# Patient Record
Sex: FEMALE | Race: WHITE | NOT HISPANIC OR LATINO | Employment: UNEMPLOYED | URBAN - METROPOLITAN AREA
[De-identification: names, ages, dates, MRNs, and addresses within clinical notes are randomized per-mention and may not be internally consistent; named-entity substitution may affect disease eponyms.]

---

## 2024-05-06 DIAGNOSIS — N60.02 BREAST CYST, LEFT: Primary | ICD-10-CM

## 2024-06-13 ENCOUNTER — APPOINTMENT (OUTPATIENT)
Dept: MAMMOGRAPHY | Age: 44
End: 2024-06-13
Attending: OBSTETRICS & GYNECOLOGY

## 2024-06-28 ENCOUNTER — HOSPITAL ENCOUNTER (OUTPATIENT)
Dept: ULTRASOUND IMAGING | Age: 44
End: 2024-06-28
Attending: OBSTETRICS & GYNECOLOGY

## 2024-06-28 ENCOUNTER — HOSPITAL ENCOUNTER (OUTPATIENT)
Dept: MAMMOGRAPHY | Age: 44
End: 2024-06-28
Attending: OBSTETRICS & GYNECOLOGY

## 2024-06-28 DIAGNOSIS — N60.02 BREAST CYST, LEFT: ICD-10-CM

## 2024-06-28 DIAGNOSIS — R92.8 ABNORMAL FINDINGS ON DIAGNOSTIC IMAGING OF BREAST: ICD-10-CM

## 2024-06-28 PROCEDURE — G0279 TOMOSYNTHESIS, MAMMO: HCPCS

## 2024-06-28 PROCEDURE — 76642 ULTRASOUND BREAST LIMITED: CPT

## 2024-10-14 ENCOUNTER — HOSPITAL ENCOUNTER (OUTPATIENT)
Age: 44
Discharge: HOME | End: 2024-10-14
Payer: COMMERCIAL

## 2024-10-14 ENCOUNTER — APPOINTMENT (OUTPATIENT)
Dept: GENERAL RADIOLOGY | Age: 44
End: 2024-10-14
Attending: NURSE PRACTITIONER
Payer: COMMERCIAL

## 2024-10-14 VITALS
SYSTOLIC BLOOD PRESSURE: 107 MMHG | DIASTOLIC BLOOD PRESSURE: 73 MMHG | HEART RATE: 87 BPM | TEMPERATURE: 98 F | OXYGEN SATURATION: 99 % | RESPIRATION RATE: 18 BRPM

## 2024-10-14 DIAGNOSIS — R05.1 ACUTE COUGH: Primary | ICD-10-CM

## 2024-10-14 PROCEDURE — 71046 X-RAY EXAM CHEST 2 VIEWS: CPT | Performed by: NURSE PRACTITIONER

## 2024-10-14 PROCEDURE — 99203 OFFICE O/P NEW LOW 30 MIN: CPT | Performed by: NURSE PRACTITIONER

## 2024-10-14 RX ORDER — BENZONATATE 100 MG/1
100 CAPSULE ORAL 3 TIMES DAILY PRN
Qty: 30 CAPSULE | Refills: 0 | Status: SHIPPED | OUTPATIENT
Start: 2024-10-14

## 2024-10-14 NOTE — ED PROVIDER NOTES
Patient Seen in: [facility]      History     Chief Complaint   Patient presents with    Cough    Fever     Stated Complaint: cough, chest pain    Subjective:   45 y/o female with unremarkable medical history presents with c/o cough x 3 weeks. Low grade fevers, mild sob, some cp when coughing only. No chills, abd pain, n/v/d              Objective:     History reviewed. No pertinent past medical history.           History reviewed. No pertinent surgical history.             Social History     Socioeconomic History    Marital status: Single   Tobacco Use    Smoking status: Some Days     Types: Cigarettes     Passive exposure: Never    Smokeless tobacco: Never   Vaping Use    Vaping status: Some Days              Review of Systems   Constitutional:  Positive for fever. Negative for chills.   HENT:  Negative for congestion, rhinorrhea and sore throat.    Respiratory:  Positive for cough and shortness of breath.    Cardiovascular:  Positive for chest pain (when coughing only).   Gastrointestinal:  Negative for abdominal pain, diarrhea, nausea and vomiting.   Neurological:  Negative for headaches.   All other systems reviewed and are negative.      Positive for stated complaint: cough, chest pain  Other systems are as noted in HPI.  Constitutional and vital signs reviewed.      All other systems reviewed and negative except as noted above.    Physical Exam     ED Triage Vitals [10/14/24 1007]   /73   Pulse 87   Resp 18   Temp 98 °F (36.7 °C)   Temp src Temporal   SpO2 99 %   O2 Device        Current Vitals:   Vital Signs  BP: 107/73  Pulse: 87  Resp: 18  Temp: 98 °F (36.7 °C)  Temp src: Temporal    Oxygen Therapy  SpO2: 99 %        Physical Exam  Vitals and nursing note reviewed.   Constitutional:       General: She is not in acute distress.     Appearance: Normal appearance. She is not ill-appearing.   HENT:      Head: Normocephalic.      Right Ear: Tympanic membrane and external ear normal.       Left Ear: Tympanic membrane and external ear normal.      Nose: Nose normal.      Mouth/Throat:      Mouth: Mucous membranes are moist.      Pharynx: Oropharynx is clear. Uvula midline.      Tonsils: No tonsillar exudate.   Cardiovascular:      Rate and Rhythm: Normal rate and regular rhythm.   Pulmonary:      Effort: Pulmonary effort is normal.      Breath sounds: Normal breath sounds.   Musculoskeletal:         General: Normal range of motion.      Cervical back: Normal range of motion and neck supple.   Skin:     General: Skin is warm.      Capillary Refill: Capillary refill takes less than 2 seconds.   Neurological:      Mental Status: She is alert and oriented to person, place, and time.      GCS: GCS eye subscore is 4. GCS verbal subscore is 5. GCS motor subscore is 6.   Psychiatric:         Behavior: Behavior is cooperative.             ED Course   Labs Reviewed - No data to display  XR CHEST PA + LAT CHEST (CPT=71046)          PROCEDURE: XR CHEST PA + LAT CHEST (CPT=71046)     COMPARISON: None.     INDICATIONS: Acute chough, shortness of breath, and chest pain     TECHNIQUE:   Two views.       FINDINGS:   CARDIAC/VASC: No cardiac silhouette abnormality or cardiomegaly.  Unremarkable pulmonary vasculature.    MEDIAST/JIM: No visible mass or adenopathy.   LUNGS/PLEURA: Normal.  No significant pulmonary parenchymal abnormalities.  No effusion or pleural thickening.    BONES: Mild S-shaped curvature dorsal spine.  Mild pectus excavatum inferior margin anterior chest  OTHER: Small sclerotic bony lesion right humeral head probably an incidental bone island              =====  CONCLUSION:   1. No acute cardiopulmonary disease           Dictated by (CST): Pete Mayers MD on 10/14/2024 at 10:25 AM       Finalized by (CST): Pete Mayers MD on 10/14/2024 at 10:28 AM                               [facility]              Medical Decision Making  Patient is well-appearing. Resp easy non labored  I discussed  differentials with patient including but not limited to viral uri vs pneumonia.  Xray independently reviewed and discussed with patient. Negative for pneumonia  Push fluids, cool mist humidifier, good hand washing  RX benzonatate  otc meds prn  Fu with PCP. Return/ ED precautions discussed      Problems Addressed:  Acute cough: acute illness or injury    Amount and/or Complexity of Data Reviewed  Radiology: ordered. Decision-making details documented in ED Course.    Risk  OTC drugs.  Prescription drug management.        Disposition and Plan     Clinical Impression:  1. Acute cough         Disposition:  Discharge  10/14/2024 10:37 am    Follow-up:  Basilio Pratt MD  [address] [place] [address]  [phone]      or follow up with your Primary Doctor          Medications Prescribed:  Discharge Medication List as of 10/14/2024 11:01 AM        START taking these medications    Details   benzonatate 100 MG Oral Cap Take 1 capsule (100 mg total) by mouth 3 (three) times daily as needed for cough., Normal, Disp-30 capsule, R-0                 Supplementary Documentation:

## 2024-10-14 NOTE — ED INITIAL ASSESSMENT (HPI)
Pt presents with cough x 3 weeks. Pt reports low grade fever and mild SOB. Pt non-productive cough, was productive early in illness. CP with deep cough. Pt reports hx of Bronchitis.

## 2025-02-18 ENCOUNTER — OFFICE VISIT (OUTPATIENT)
Facility: CLINIC | Age: 45
End: 2025-02-18
Payer: COMMERCIAL

## 2025-02-18 ENCOUNTER — LAB ENCOUNTER (OUTPATIENT)
Dept: LAB | Facility: REFERENCE LAB | Age: 45
End: 2025-02-18
Attending: FAMILY MEDICINE
Payer: COMMERCIAL

## 2025-02-18 VITALS
OXYGEN SATURATION: 98 % | BODY MASS INDEX: 21.34 KG/M2 | HEART RATE: 78 BPM | DIASTOLIC BLOOD PRESSURE: 72 MMHG | WEIGHT: 125 LBS | HEIGHT: 64 IN | SYSTOLIC BLOOD PRESSURE: 120 MMHG

## 2025-02-18 DIAGNOSIS — Z97.5 IUD (INTRAUTERINE DEVICE) IN PLACE: ICD-10-CM

## 2025-02-18 DIAGNOSIS — N93.0 BLEEDING AFTER INTERCOURSE: ICD-10-CM

## 2025-02-18 DIAGNOSIS — Z00.01 ENCOUNTER FOR GENERAL ADULT MEDICAL EXAMINATION WITH ABNORMAL FINDINGS: ICD-10-CM

## 2025-02-18 DIAGNOSIS — Z11.3 SCREENING FOR STD (SEXUALLY TRANSMITTED DISEASE): ICD-10-CM

## 2025-02-18 DIAGNOSIS — N76.0 ACUTE VAGINITIS: ICD-10-CM

## 2025-02-18 DIAGNOSIS — Z00.01 ENCOUNTER FOR GENERAL ADULT MEDICAL EXAMINATION WITH ABNORMAL FINDINGS: Primary | ICD-10-CM

## 2025-02-18 DIAGNOSIS — Z12.31 VISIT FOR SCREENING MAMMOGRAM: ICD-10-CM

## 2025-02-18 DIAGNOSIS — Z12.4 SCREENING FOR CERVICAL CANCER: ICD-10-CM

## 2025-02-18 LAB
ANION GAP SERPL CALC-SCNC: 9 MMOL/L (ref 0–18)
BASOPHILS # BLD AUTO: 0.06 X10(3) UL (ref 0–0.2)
BASOPHILS NFR BLD AUTO: 0.8 %
BUN BLD-MCNC: 8 MG/DL (ref 9–23)
BUN/CREAT SERPL: 12.7 (ref 10–20)
CALCIUM BLD-MCNC: 9.5 MG/DL (ref 8.7–10.4)
CHLORIDE SERPL-SCNC: 103 MMOL/L (ref 98–112)
CHOLEST SERPL-MCNC: 162 MG/DL (ref ?–200)
CO2 SERPL-SCNC: 27 MMOL/L (ref 21–32)
CREAT BLD-MCNC: 0.63 MG/DL
DEPRECATED RDW RBC AUTO: 43.4 FL (ref 35.1–46.3)
EGFRCR SERPLBLD CKD-EPI 2021: 113 ML/MIN/1.73M2 (ref 60–?)
EOSINOPHIL # BLD AUTO: 0.27 X10(3) UL (ref 0–0.7)
EOSINOPHIL NFR BLD AUTO: 3.7 %
ERYTHROCYTE [DISTWIDTH] IN BLOOD BY AUTOMATED COUNT: 11.9 % (ref 11–15)
EST. AVERAGE GLUCOSE BLD GHB EST-MCNC: 105 MG/DL (ref 68–126)
FASTING PATIENT LIPID ANSWER: YES
FASTING STATUS PATIENT QL REPORTED: YES
GLUCOSE BLD-MCNC: 104 MG/DL (ref 70–99)
HBA1C MFR BLD: 5.3 % (ref ?–5.7)
HBV SURFACE AB SER QL: NONREACTIVE
HBV SURFACE AB SERPL IA-ACNC: <3.1 MIU/ML
HBV SURFACE AG SER-ACNC: <0.1 [IU]/L
HBV SURFACE AG SERPL QL IA: NONREACTIVE
HCT VFR BLD AUTO: 42.7 %
HCV AB SERPL QL IA: NONREACTIVE
HDLC SERPL-MCNC: 52 MG/DL (ref 40–59)
HGB BLD-MCNC: 14.4 G/DL
IMM GRANULOCYTES # BLD AUTO: 0.02 X10(3) UL (ref 0–1)
IMM GRANULOCYTES NFR BLD: 0.3 %
LDLC SERPL CALC-MCNC: 98 MG/DL (ref ?–100)
LYMPHOCYTES # BLD AUTO: 1.09 X10(3) UL (ref 1–4)
LYMPHOCYTES NFR BLD AUTO: 14.8 %
MCH RBC QN AUTO: 33 PG (ref 26–34)
MCHC RBC AUTO-ENTMCNC: 33.7 G/DL (ref 31–37)
MCV RBC AUTO: 97.7 FL
MONOCYTES # BLD AUTO: 0.73 X10(3) UL (ref 0.1–1)
MONOCYTES NFR BLD AUTO: 9.9 %
NEUTROPHILS # BLD AUTO: 5.19 X10 (3) UL (ref 1.5–7.7)
NEUTROPHILS # BLD AUTO: 5.19 X10(3) UL (ref 1.5–7.7)
NEUTROPHILS NFR BLD AUTO: 70.5 %
NONHDLC SERPL-MCNC: 110 MG/DL (ref ?–130)
OSMOLALITY SERPL CALC.SUM OF ELEC: 287 MOSM/KG (ref 275–295)
PLATELET # BLD AUTO: 331 10(3)UL (ref 150–450)
POTASSIUM SERPL-SCNC: 4.2 MMOL/L (ref 3.5–5.1)
RBC # BLD AUTO: 4.37 X10(6)UL
SODIUM SERPL-SCNC: 139 MMOL/L (ref 136–145)
T PALLIDUM AB SER QL IA: NONREACTIVE
TRIGL SERPL-MCNC: 60 MG/DL (ref 30–149)
TSI SER-ACNC: 0.77 UIU/ML (ref 0.55–4.78)
VLDLC SERPL CALC-MCNC: 10 MG/DL (ref 0–30)
WBC # BLD AUTO: 7.4 X10(3) UL (ref 4–11)

## 2025-02-18 PROCEDURE — 88175 CYTOPATH C/V AUTO FLUID REDO: CPT | Performed by: FAMILY MEDICINE

## 2025-02-18 PROCEDURE — 86780 TREPONEMA PALLIDUM: CPT

## 2025-02-18 PROCEDURE — 87624 HPV HI-RISK TYP POOLED RSLT: CPT | Performed by: FAMILY MEDICINE

## 2025-02-18 PROCEDURE — 83036 HEMOGLOBIN GLYCOSYLATED A1C: CPT

## 2025-02-18 PROCEDURE — 80048 BASIC METABOLIC PNL TOTAL CA: CPT

## 2025-02-18 PROCEDURE — 36415 COLL VENOUS BLD VENIPUNCTURE: CPT

## 2025-02-18 PROCEDURE — 87389 HIV-1 AG W/HIV-1&-2 AB AG IA: CPT

## 2025-02-18 PROCEDURE — 86706 HEP B SURFACE ANTIBODY: CPT

## 2025-02-18 PROCEDURE — 85025 COMPLETE CBC W/AUTO DIFF WBC: CPT

## 2025-02-18 PROCEDURE — 84443 ASSAY THYROID STIM HORMONE: CPT

## 2025-02-18 PROCEDURE — 87340 HEPATITIS B SURFACE AG IA: CPT

## 2025-02-18 PROCEDURE — 81514 NFCT DS BV&VAGINITIS DNA ALG: CPT | Performed by: FAMILY MEDICINE

## 2025-02-18 PROCEDURE — 80061 LIPID PANEL: CPT

## 2025-02-18 PROCEDURE — 86803 HEPATITIS C AB TEST: CPT

## 2025-02-18 RX ORDER — LEVONORGESTREL 52 MG/1
INTRAUTERINE DEVICE INTRAUTERINE
COMMUNITY
Start: 2024-05-02

## 2025-02-18 NOTE — PROGRESS NOTES
Subjective:   Cindy Poe is a 44 year old female who presents for Physical (Pt also wants STI testing )     Patient presents to establish care. Has concern of new vaginal odor and white discharge. Would like STI screening. Also has new IUD in placed, mirena, 6 months ago. Since insertion has noted spotting after intercourse.     Patient does have known histroy of breast cysts. Last mammogram done 6/2024. Was recommended to repeat in about 1 year.     History/Other:    Chief Complaint Reviewed and Verified  Nursing Notes Reviewed and   Verified  Tobacco Reviewed  Allergies Reviewed  Medications Reviewed    Problem List Reviewed  Medical History Reviewed  Surgical History   Reviewed  OB Status Reviewed  Family History Reviewed  Social History   Reviewed         Tobacco:  Social History     Tobacco Use   Smoking Status Some Days    Types: Cigarettes    Passive exposure: Never   Smokeless Tobacco Never     E-Cigarettes/Vaping       Questions Responses    E-Cigarette Use Current Some Day User           Tobacco cessation counseling for <3 minutes.      Current Outpatient Medications   Medication Sig Dispense Refill    Levonorgestrel (MIRENA, 52 MG,) 20 MCG/DAY Intrauterine IUD by Intrauterine route.           Review of Systems:  Review of Systems   Constitutional: Negative.    HENT: Negative.     Eyes: Negative.    Respiratory: Negative.     Cardiovascular: Negative.    Gastrointestinal: Negative.    Genitourinary:  Positive for vaginal bleeding and vaginal discharge.   Musculoskeletal: Negative.    Skin: Negative.    Neurological: Negative.    Psychiatric/Behavioral: Negative.         Objective:   /72   Pulse 78   Ht 5' 4\" (1.626 m)   Wt 125 lb (56.7 kg)   LMP 02/03/2025 (Approximate)   SpO2 98%   BMI 21.46 kg/m²  Estimated body mass index is 21.46 kg/m² as calculated from the following:    Height as of this encounter: 5' 4\" (1.626 m).    Weight as of this encounter: 125 lb (56.7 kg).  Physical  Exam  Vitals and nursing note reviewed.   Constitutional:       General: She is not in acute distress.     Appearance: Normal appearance. She is not ill-appearing.   HENT:      Head: Normocephalic and atraumatic.      Right Ear: Tympanic membrane normal. There is no impacted cerumen.      Left Ear: Tympanic membrane normal. There is no impacted cerumen.      Mouth/Throat:      Mouth: Mucous membranes are moist.      Pharynx: Oropharynx is clear. No oropharyngeal exudate or posterior oropharyngeal erythema.   Eyes:      General:         Right eye: No discharge.         Left eye: No discharge.      Extraocular Movements: Extraocular movements intact.      Pupils: Pupils are equal, round, and reactive to light.   Cardiovascular:      Rate and Rhythm: Normal rate and regular rhythm.      Heart sounds: Normal heart sounds. No murmur heard.     No friction rub. No gallop.   Pulmonary:      Effort: Pulmonary effort is normal.      Breath sounds: Normal breath sounds. No wheezing, rhonchi or rales.   Abdominal:      General: Abdomen is flat. Bowel sounds are normal. There is no distension.      Palpations: Abdomen is soft. There is no mass.      Tenderness: There is no abdominal tenderness. There is no guarding or rebound.   Genitourinary:     Cervix: Normal. No cervical motion tenderness, discharge, friability, lesion, erythema or cervical bleeding.      Comments: IUD strings visible at cervical os  Musculoskeletal:         General: Normal range of motion.      Cervical back: Normal range of motion.      Right lower leg: No edema.      Left lower leg: No edema.   Skin:     General: Skin is warm and dry.      Findings: No rash.   Neurological:      General: No focal deficit present.      Mental Status: She is alert. Mental status is at baseline.   Psychiatric:         Mood and Affect: Mood normal.         Behavior: Behavior normal.       Offered patient chaperone for  exam. Patient would like chaperone. MA present for  entire pap smear/pelvic exam      Assessment & Plan:   1. Encounter for general adult medical examination with abnormal findings (Primary)  -     Basic Metabolic Panel (8); Future; Expected date: 02/18/2025  -     Hemoglobin A1C; Future; Expected date: 02/18/2025  -     Lipid Panel; Future; Expected date: 02/18/2025  -     TSH W Reflex To Free T4; Future; Expected date: 02/18/2025  -     CBC With Differential With Platelet; Future; Expected date: 02/18/2025    -ordered annual labs  -declines all vaccines    2. Visit for screening mammogram  -     3D Mammogram Digital Screen, Bilateral (CPT=77067/15232); Future; Expected date: 02/18/2025    -due ordered    3. Screening for cervical cancer  -     ThinPrep PAP Smear B; Future; Expected date: 02/18/2025  -     Hpv High Risk , Thin Prep Collect; Future; Expected date: 02/18/2025    -collected today    4. Screening for STD (sexually transmitted disease)  -     Vaginitis Vaginosis PCR Panel; Future; Expected date: 02/18/2025  -     Chlamydia/Gc Amplification; Future; Expected date: 02/18/2025  -     HCV Antibody; Future; Expected date: 02/18/2025  -     Hepatitis B Surface Antibody; Future; Expected date: 02/18/2025  -     Hepatitis B Surface Antigen; Future; Expected date: 02/18/2025  -     HIV Ag/Ab Combo; Future; Expected date: 02/18/2025  -     T Pallidum Screening Cascade; Future; Expected date: 02/18/2025    -ordered today    5. Acute vaginitis  -     Vaginitis Vaginosis PCR Panel; Future; Expected date: 02/18/2025    -collected swab    6. IUD (intrauterine device) in place  -in place      7. Bleeding after intercourse  Ordered labs including STD testing and vaginal swab to begin workup.  Collected Pap smear today for workup as well.  If labs and Pap smear normal will proceed with ultrasound.        Return in about 1 year (around 2/18/2026), or if symptoms worsen or fail to improve.    Delilah Lindsey MD, 2/18/2025, 9:45 AM

## 2025-02-19 ENCOUNTER — PATIENT MESSAGE (OUTPATIENT)
Facility: CLINIC | Age: 45
End: 2025-02-19

## 2025-02-19 DIAGNOSIS — B37.31 VAGINAL CANDIDA: Primary | ICD-10-CM

## 2025-02-19 DIAGNOSIS — B96.89 BACTERIAL VAGINITIS: Primary | ICD-10-CM

## 2025-02-19 DIAGNOSIS — N76.0 BACTERIAL VAGINITIS: Primary | ICD-10-CM

## 2025-02-19 LAB
BV BACTERIA DNA VAG QL NAA+PROBE: POSITIVE
C GLABRATA DNA VAG QL NAA+PROBE: NEGATIVE
C KRUSEI DNA VAG QL NAA+PROBE: NEGATIVE
CANDIDA DNA VAG QL NAA+PROBE: NEGATIVE
HPV E6+E7 MRNA CVX QL NAA+PROBE: NEGATIVE
T VAGINALIS DNA VAG QL NAA+PROBE: NEGATIVE

## 2025-02-19 RX ORDER — FLUCONAZOLE 150 MG/1
TABLET ORAL
Qty: 2 TABLET | Refills: 0 | Status: SHIPPED | OUTPATIENT
Start: 2025-02-19

## 2025-02-19 RX ORDER — METRONIDAZOLE 500 MG/1
500 TABLET ORAL 2 TIMES DAILY
Qty: 14 TABLET | Refills: 0 | Status: SHIPPED | OUTPATIENT
Start: 2025-02-19 | End: 2025-02-26

## 2025-03-12 ENCOUNTER — HOSPITAL ENCOUNTER (OUTPATIENT)
Age: 45
Discharge: HOME | End: 2025-03-12
Payer: COMMERCIAL

## 2025-03-12 VITALS
HEART RATE: 75 BPM | SYSTOLIC BLOOD PRESSURE: 121 MMHG | OXYGEN SATURATION: 100 % | DIASTOLIC BLOOD PRESSURE: 79 MMHG | RESPIRATION RATE: 20 BRPM | TEMPERATURE: 99 F

## 2025-03-12 DIAGNOSIS — N89.8 VAGINAL DISCHARGE: Primary | ICD-10-CM

## 2025-03-12 LAB
B-HCG UR QL: NEGATIVE
BILIRUB UR QL STRIP: NEGATIVE
CLARITY UR: CLEAR
COLOR UR: YELLOW
GLUCOSE UR STRIP-MCNC: NEGATIVE MG/DL
HGB UR QL STRIP: NEGATIVE
KETONES UR STRIP-MCNC: NEGATIVE MG/DL
LEUKOCYTE ESTERASE UR QL STRIP: NEGATIVE
NITRITE UR QL STRIP: NEGATIVE
PH UR STRIP: 6.5 [PH]
PROT UR STRIP-MCNC: 30 MG/DL
SP GR UR STRIP: >=1.03
UROBILINOGEN UR STRIP-ACNC: <2 MG/DL

## 2025-03-12 PROCEDURE — 96372 THER/PROPH/DIAG INJ SC/IM: CPT | Performed by: PHYSICIAN ASSISTANT

## 2025-03-12 PROCEDURE — 81025 URINE PREGNANCY TEST: CPT | Performed by: PHYSICIAN ASSISTANT

## 2025-03-12 PROCEDURE — 99214 OFFICE O/P EST MOD 30 MIN: CPT | Performed by: PHYSICIAN ASSISTANT

## 2025-03-12 PROCEDURE — 81002 URINALYSIS NONAUTO W/O SCOPE: CPT | Performed by: PHYSICIAN ASSISTANT

## 2025-03-12 RX ORDER — DOXYCYCLINE 100 MG/1
100 CAPSULE ORAL 2 TIMES DAILY
Qty: 14 CAPSULE | Refills: 0 | Status: SHIPPED | OUTPATIENT
Start: 2025-03-12 | End: 2025-03-19

## 2025-03-13 NOTE — ED PROVIDER NOTES
Patient Seen in: [facility]      History     Chief Complaint   Patient presents with    Eval-G     Stated Complaint: Vaginal Ordor    Subjective:   HPI      Patient is a 44-year-old female who presents to immediate care due to vaginal discharge x 2 days.  Associate symptoms include vaginal odor.  Patient states she has had similar symptoms in the past diagnosed with chlamydia.  Denies abdominal pain flank pain dysuria hematuria.  Denies treatment prior to arrival.    Objective:     History reviewed. No pertinent past medical history.           History reviewed. No pertinent surgical history.             No pertinent social history.            Review of Systems    Positive for stated complaint: Vaginal Ordor  Other systems are as noted in HPI.  Constitutional and vital signs reviewed.      All other systems reviewed and negative except as noted above.    Physical Exam     ED Triage Vitals [03/12/25 1930]   /79   Pulse 75   Resp 20   Temp 98.5 °F (36.9 °C)   Temp src Oral   SpO2 100 %   O2 Device None (Room air)       Current Vitals:   Vital Signs  BP: 121/79  Pulse: 75  Resp: 20  Temp: 98.5 °F (36.9 °C)  Temp src: Oral    Oxygen Therapy  SpO2: 100 %  O2 Device: None (Room air)        Physical Exam  Vital signs reviewed. Nursing note reviewed.  Constitutional: Well-developed. Well-nourished. In no acute distress  HENT: Mucous membranes moist.   EYES: No scleral icterus or conjunctival injection.  NECK: Full ROM. Supple.   CARDIAC: Normal rate. Normal S1/ S2. 2+ distal pulses. No edema  PULM/CHEST: Clear to auscultation bilaterally. No wheezes  ABD: Soft, non-tender, non-distended.   : No CVA tenderness.  No CMT, scant vaginal discharge.  No adnexal tenderness  RECTAL: deferred  Extremities: Full ROM  NEURO: Awake, alert, following commands, moving extremities, answering questions.   SKIN: Warm and dry. No rash or lesions.  PSYCH: Normal judgment. Normal affect.        ED Course     Labs Reviewed    [facility] POCT URINALYSIS DIPSTICK - Abnormal; Notable for the following components:       Result Value    Protein urine 30 (*)     All other components within normal limits   POCT PREGNANCY URINE - Normal   VAGINITIS VAGINOSIS PCR PANEL   CHLAMYDIA/GONOCOCCUS, HAY                   MDM      Patient is a 44-year-old female that presents to immediate care due to vaginal discharge x 2 days.  Patient has stable vitals.  Physical exam showing scant vaginal white discharge with no CMT or adnexal tenderness.  Will test and treat for gonorrhea and chlamydia as patient notes that symptoms feel similar to previous STI infections.  Less likely PID or TOA.  Urinalysis negative for infection, urine pregnancy negative.  Will give Rocephin prior to discharge.  Will prescribe doxycycline to pharmacy.  Return protocols including worsening symptoms, or development of fever abdominal pain        Medical Decision Making      Disposition and Plan     Clinical Impression:  1. Vaginal discharge         Disposition:  Discharge  3/12/2025  7:53 pm    Follow-up:  Delilah Lindsey MD  [address] [address]  [phone]    Call             Medications Prescribed:  Current Discharge Medication List        START taking these medications    Details   doxycycline 100 MG Oral Cap Take 1 capsule (100 mg total) by mouth 2 (two) times daily for 7 days.  Qty: 14 capsule, Refills: 0                 Supplementary Documentation:

## 2025-03-13 NOTE — ED INITIAL ASSESSMENT (HPI)
Pt came in for STD testing. Pt stated she has been having vaginal discharge, itchiness, and foul odor for the past 2 days. Pt stated she is unsure if it might be urinary related. Pt has easy non labored respirations.

## 2025-03-14 LAB
BV BACTERIA DNA VAG QL NAA+PROBE: POSITIVE
C GLABRATA DNA VAG QL NAA+PROBE: NEGATIVE
C KRUSEI DNA VAG QL NAA+PROBE: NEGATIVE
C TRACH DNA SPEC QL NAA+PROBE: NEGATIVE
CANDIDA DNA VAG QL NAA+PROBE: NEGATIVE
N GONORRHOEA DNA SPEC QL NAA+PROBE: NEGATIVE
T VAGINALIS DNA VAG QL NAA+PROBE: NEGATIVE

## 2025-03-14 RX ORDER — METRONIDAZOLE 500 MG/1
500 TABLET ORAL 2 TIMES DAILY
Qty: 14 TABLET | Refills: 0 | Status: SHIPPED | OUTPATIENT
Start: 2025-03-14 | End: 2025-03-21

## 2025-07-28 ENCOUNTER — HOSPITAL ENCOUNTER (OUTPATIENT)
Age: 45
Discharge: HOME | End: 2025-07-28

## 2025-07-28 VITALS
HEART RATE: 86 BPM | RESPIRATION RATE: 20 BRPM | SYSTOLIC BLOOD PRESSURE: 114 MMHG | OXYGEN SATURATION: 99 % | TEMPERATURE: 98 F | DIASTOLIC BLOOD PRESSURE: 78 MMHG

## 2025-07-28 DIAGNOSIS — B37.9 ANTIBIOTIC-INDUCED YEAST INFECTION: ICD-10-CM

## 2025-07-28 DIAGNOSIS — T36.95XA ANTIBIOTIC-INDUCED YEAST INFECTION: ICD-10-CM

## 2025-07-28 DIAGNOSIS — B96.89 BACTERIAL VAGINOSIS: Primary | ICD-10-CM

## 2025-07-28 DIAGNOSIS — N76.0 BACTERIAL VAGINOSIS: Primary | ICD-10-CM

## 2025-07-28 LAB
B-HCG UR QL: NEGATIVE
BILIRUB UR QL STRIP: NEGATIVE
CLARITY UR: CLEAR
COLOR UR: YELLOW
GLUCOSE UR STRIP-MCNC: NEGATIVE MG/DL
HGB UR QL STRIP: NEGATIVE
KETONES UR STRIP-MCNC: 40 MG/DL
LEUKOCYTE ESTERASE UR QL STRIP: NEGATIVE
NITRITE UR QL STRIP: NEGATIVE
PH UR STRIP: 6
PROT UR STRIP-MCNC: NEGATIVE MG/DL
SP GR UR STRIP: 1.02
UROBILINOGEN UR STRIP-ACNC: <2 MG/DL

## 2025-07-28 PROCEDURE — 99214 OFFICE O/P EST MOD 30 MIN: CPT | Performed by: NURSE PRACTITIONER

## 2025-07-28 PROCEDURE — 81002 URINALYSIS NONAUTO W/O SCOPE: CPT | Performed by: NURSE PRACTITIONER

## 2025-07-28 PROCEDURE — 81025 URINE PREGNANCY TEST: CPT | Performed by: NURSE PRACTITIONER

## 2025-07-28 RX ORDER — METRONIDAZOLE 500 MG/1
500 TABLET ORAL 2 TIMES DAILY
Qty: 14 TABLET | Refills: 0 | Status: SHIPPED | OUTPATIENT
Start: 2025-07-28 | End: 2025-08-04

## 2025-07-28 RX ORDER — FLUCONAZOLE 150 MG/1
150 TABLET ORAL AS DIRECTED
Qty: 2 TABLET | Refills: 0 | Status: SHIPPED | OUTPATIENT
Start: 2025-07-28

## 2025-07-29 LAB
BV BACTERIA DNA VAG QL NAA+PROBE: POSITIVE
C GLABRATA DNA VAG QL NAA+PROBE: NEGATIVE
C KRUSEI DNA VAG QL NAA+PROBE: NEGATIVE
CANDIDA DNA VAG QL NAA+PROBE: NEGATIVE
T VAGINALIS DNA VAG QL NAA+PROBE: NEGATIVE